# Patient Record
Sex: MALE
[De-identification: names, ages, dates, MRNs, and addresses within clinical notes are randomized per-mention and may not be internally consistent; named-entity substitution may affect disease eponyms.]

---

## 2021-01-13 PROBLEM — Z00.00 ENCOUNTER FOR PREVENTIVE HEALTH EXAMINATION: Status: ACTIVE | Noted: 2021-01-13

## 2021-01-22 ENCOUNTER — NON-APPOINTMENT (OUTPATIENT)
Age: 61
End: 2021-01-22

## 2021-01-25 ENCOUNTER — APPOINTMENT (OUTPATIENT)
Dept: RADIATION ONCOLOGY | Facility: CLINIC | Age: 61
End: 2021-01-25
Payer: COMMERCIAL

## 2021-01-25 DIAGNOSIS — Z98.890 OTHER SPECIFIED POSTPROCEDURAL STATES: ICD-10-CM

## 2021-01-25 DIAGNOSIS — R73.03 PREDIABETES.: ICD-10-CM

## 2021-01-25 DIAGNOSIS — Z60.2 PROBLEMS RELATED TO LIVING ALONE: ICD-10-CM

## 2021-01-25 DIAGNOSIS — Z86.39 PERSONAL HISTORY OF OTHER ENDOCRINE, NUTRITIONAL AND METABOLIC DISEASE: ICD-10-CM

## 2021-01-25 DIAGNOSIS — M54.16 RADICULOPATHY, LUMBAR REGION: ICD-10-CM

## 2021-01-25 DIAGNOSIS — Z87.19 OTHER SPECIFIED POSTPROCEDURAL STATES: ICD-10-CM

## 2021-01-25 DIAGNOSIS — N52.35 ERECTILE DYSFUNCTION FOLLOWING RADIATION THERAPY: ICD-10-CM

## 2021-01-25 DIAGNOSIS — M54.12 RADICULOPATHY, CERVICAL REGION: ICD-10-CM

## 2021-01-25 DIAGNOSIS — Z86.59 PERSONAL HISTORY OF OTHER MENTAL AND BEHAVIORAL DISORDERS: ICD-10-CM

## 2021-01-25 PROCEDURE — 99205 OFFICE O/P NEW HI 60 MIN: CPT

## 2021-01-25 PROCEDURE — 99072 ADDL SUPL MATRL&STAF TM PHE: CPT

## 2021-01-25 SDOH — SOCIAL STABILITY - SOCIAL INSECURITY: PROBLEMS RELATED TO LIVING ALONE: Z60.2

## 2021-01-25 NOTE — HISTORY OF PRESENT ILLNESS
[FreeTextEntry1] : Mr. 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 He was originally seen in consultation on [id number] and calls us, today, to review his definitive treatment options.\par \par History of present illness: this is a 60-year-old gentleman who, on routine screening, was found to have an elevated PSA for several years. Unfortunately, we do not have any of his original PSA values. On 4/7/2014 he underwent TRUS-guided saturation prostate needle biopsy, which demonstrated prostate adenocarcinoma in 1/24 cores, with [place] 6 (3+3) found in 5% of and involved right lateral apex core. He elected to pursue active surveillance. On 4/8/16 He underwent repeat prostate needle biopsy, which revealed prostate adenocarcinoma In 1/12 cores, with [place] 6 (3+3) in 10% of an affected left medial base core.  He subsequently elected to pursue active surveillance. On 1/11/2019 he underwent repeat prostate needle biopsy, which revealed prostate adenocarcinoma in 7/12 multifocal and bilateral cores, with highest grade histology [place] 6 (3+3) which was found in<5-30% of affected cores. His PSA was 13.30 ng/mL on 1/25/2019.  He notes he was counseled on the need for treatment given the increase in positive cores, but elected not to do so. His PSA was 13.10 ng/mL on 3/4/20.  He was evaluated by us on 7/13/20.\par \par We sent him for restaging workup, and to meet with Dr. Cunha as part of that process.  His PSA was 15.6ng/ml on 10/9/20.  On 11/4/20 he underwent TRUS-guided prostate needle biopsy, which revealed prostate adenocarcinoma in 5/12 cores, with 4-left sided cores with El[place] 7 (3+4) disease in 60-70% of the affected cores, and 1 left sided core with [place] 6 (3+3) histology.  A CT of the abdomen and pelvis on 1/7/21 was without evidence of adenopathy; some bladder wall thickening was observed; a bone scan performed the same day was without scintigraphic evidence of osseous metastatic disease.\par \par Currently, he notes good urine function, with nocturia x0.  He notes that he is able to have erections, but has experience erectile dysfunction. He is awaiting a new colonoscopy. He is interested in sperm banking.  He once again denies a history of radiation\par

## 2021-01-26 ENCOUNTER — NON-APPOINTMENT (OUTPATIENT)
Age: 61
End: 2021-01-26

## 2021-02-24 ENCOUNTER — NON-APPOINTMENT (OUTPATIENT)
Age: 61
End: 2021-02-24

## 2021-03-19 ENCOUNTER — NON-APPOINTMENT (OUTPATIENT)
Age: 61
End: 2021-03-19

## 2021-04-06 ENCOUNTER — NON-APPOINTMENT (OUTPATIENT)
Age: 61
End: 2021-04-06

## 2021-05-20 ENCOUNTER — NON-APPOINTMENT (OUTPATIENT)
Age: 61
End: 2021-05-20

## 2021-06-25 PROBLEM — I10 HYPERTENSION, UNSPECIFIED TYPE: Status: ACTIVE | Noted: 2021-01-25

## 2021-06-25 PROBLEM — Z80.42 FAMILY HISTORY OF PROSTATE CANCER: Status: ACTIVE | Noted: 2021-06-25

## 2021-06-25 PROBLEM — C61 ADENOCARCINOMA OF PROSTATE: Status: ACTIVE | Noted: 2021-06-25

## 2021-06-25 RX ORDER — ASCORBIC ACID 500 MG
TABLET ORAL
Refills: 0 | Status: ACTIVE | COMMUNITY

## 2021-06-25 RX ORDER — AMLODIPINE BESYLATE 5 MG/1
TABLET ORAL
Refills: 0 | Status: ACTIVE | COMMUNITY

## 2021-06-25 RX ORDER — TADALAFIL 5 MG/1
TABLET, FILM COATED ORAL
Refills: 0 | Status: ACTIVE | COMMUNITY

## 2021-06-25 RX ORDER — ATORVASTATIN CALCIUM 80 MG/1
TABLET, FILM COATED ORAL
Refills: 0 | Status: ACTIVE | COMMUNITY

## 2021-06-25 RX ORDER — FLUTICASONE PROPIONATE 50 UG/1
50 SPRAY, METERED NASAL
Refills: 0 | Status: ACTIVE | COMMUNITY

## 2021-06-25 RX ORDER — CHROMIUM 200 MCG
TABLET ORAL
Refills: 0 | Status: ACTIVE | COMMUNITY

## 2021-06-25 RX ORDER — LOSARTAN POTASSIUM 100 MG/1
TABLET, FILM COATED ORAL
Refills: 0 | Status: ACTIVE | COMMUNITY

## 2021-07-22 ENCOUNTER — APPOINTMENT (OUTPATIENT)
Dept: RADIATION ONCOLOGY | Facility: CLINIC | Age: 61
End: 2021-07-22

## 2021-07-22 ENCOUNTER — NON-APPOINTMENT (OUTPATIENT)
Age: 61
End: 2021-07-22

## 2021-07-22 DIAGNOSIS — Z80.42 FAMILY HISTORY OF MALIGNANT NEOPLASM OF PROSTATE: ICD-10-CM

## 2021-07-22 DIAGNOSIS — I10 ESSENTIAL (PRIMARY) HYPERTENSION: ICD-10-CM

## 2021-07-22 DIAGNOSIS — C61 MALIGNANT NEOPLASM OF PROSTATE: ICD-10-CM

## 2021-08-03 ENCOUNTER — APPOINTMENT (OUTPATIENT)
Dept: RADIATION ONCOLOGY | Facility: CLINIC | Age: 61
End: 2021-08-03

## 2021-08-03 NOTE — HISTORY OF PRESENT ILLNESS
[FreeTextEntry1] : 1/25/21\par Mr. Mayur Esteban is a 60-year-old gentleman with a diagnosis of unfavorable intermediate risk prostate cancer, Tx, [place] score 7 (3+4), PSA 15.6 ng/mL. He was originally seen in consultation on 7/13/2020 and calls us, today, to review his definitive treatment options.\par \par History of present illness: this is a 60-year-old gentleman who, on routine screening, was found to have an elevated PSA for several years. Unfortunately, we do not have any of his original PSA values. On 4/7/2014 he underwent TRUS-guided saturation prostate needle biopsy, which demonstrated prostate adenocarcinoma in 1/24 cores, with [place] 6 (3+3) found in 5% of and involved right lateral apex core. He elected to pursue active surveillance. On 4/8/16 He underwent repeat prostate needle biopsy, which revealed prostate adenocarcinoma In 1/12 cores, with [place] 6 (3+3) in 10% of an affected left medial base core.  He subsequently elected to pursue active surveillance. On 1/11/2019 he underwent repeat prostate needle biopsy, which revealed prostate adenocarcinoma in 7/12 multifocal and bilateral cores, with highest grade histology [place] 6 (3+3) which was found in<5-30% of affected cores. His PSA was 13.30 ng/mL on 1/25/2019.  He notes he was counseled on the need for treatment given the increase in positive cores, but elected not to do so. His PSA was 13.10 ng/mL on 3/4/20.  He was evaluated by us on 7/13/20.\par \par We sent him for restaging workup, and to meet with Dr. Cunha as part of that process.  His PSA was 15.6ng/ml on 10/9/20.  On 11/4/20 he underwent TRUS-guided prostate needle biopsy, which revealed prostate adenocarcinoma in 5/12 cores, with 4-left sided cores with [place] 7 (3+4) disease in 60-70% of the affected cores, and 1 left sided core with [place] 6 (3+3) histology.  A CT of the abdomen and pelvis on 1/7/21 was without evidence of adenopathy; some bladder wall thickening was observed; a bone scan performed the same day was without scintigraphic evidence of osseous metastatic disease.\par \par His PSA increased to 20.4 on 4/23/21, and was down to 0.5 by 5/17/21.\par \par Currently, he notes good urine function, with nocturia x0.  He notes that he is able to have erections, but has experience erectile dysfunction. He is awaiting a new colonoscopy. He is interested in sperm banking.  He once again denies a history of radiation\par \par \par 7/22/21- [facility]- (pt canceled) \par \par 8/3/21- [facility]- (pt canceled)